# Patient Record
Sex: MALE | Race: OTHER | HISPANIC OR LATINO | URBAN - METROPOLITAN AREA
[De-identification: names, ages, dates, MRNs, and addresses within clinical notes are randomized per-mention and may not be internally consistent; named-entity substitution may affect disease eponyms.]

---

## 2017-01-15 ENCOUNTER — INPATIENT (INPATIENT)
Facility: HOSPITAL | Age: 32
LOS: 2 days | Discharge: HOME | DRG: 390 | End: 2017-01-18
Attending: SURGERY | Admitting: SURGERY
Payer: COMMERCIAL

## 2017-01-15 VITALS
HEART RATE: 86 BPM | TEMPERATURE: 98 F | DIASTOLIC BLOOD PRESSURE: 77 MMHG | HEIGHT: 67 IN | RESPIRATION RATE: 18 BRPM | WEIGHT: 169.98 LBS | SYSTOLIC BLOOD PRESSURE: 140 MMHG | OXYGEN SATURATION: 98 %

## 2017-01-15 DIAGNOSIS — Z98.890 OTHER SPECIFIED POSTPROCEDURAL STATES: Chronic | ICD-10-CM

## 2017-01-15 DIAGNOSIS — Z98.2 PRESENCE OF CEREBROSPINAL FLUID DRAINAGE DEVICE: Chronic | ICD-10-CM

## 2017-01-15 DIAGNOSIS — K56.69 OTHER INTESTINAL OBSTRUCTION: ICD-10-CM

## 2017-01-15 PROCEDURE — 99222 1ST HOSP IP/OBS MODERATE 55: CPT | Mod: AI

## 2017-01-15 PROCEDURE — 74177 CT ABD & PELVIS W/CONTRAST: CPT | Mod: 26

## 2017-01-15 PROCEDURE — 74020: CPT | Mod: 26

## 2017-01-15 PROCEDURE — 99285 EMERGENCY DEPT VISIT HI MDM: CPT | Mod: 25

## 2017-01-15 RX ORDER — ONDANSETRON 8 MG/1
4 TABLET, FILM COATED ORAL ONCE
Qty: 0 | Refills: 0 | Status: COMPLETED | OUTPATIENT
Start: 2017-01-15 | End: 2017-01-15

## 2017-01-15 RX ORDER — SODIUM CHLORIDE 9 MG/ML
1000 INJECTION INTRAMUSCULAR; INTRAVENOUS; SUBCUTANEOUS
Qty: 0 | Refills: 0 | Status: COMPLETED | OUTPATIENT
Start: 2017-01-15 | End: 2017-01-15

## 2017-01-15 RX ORDER — MORPHINE SULFATE 50 MG/1
4 CAPSULE, EXTENDED RELEASE ORAL ONCE
Qty: 0 | Refills: 0 | Status: DISCONTINUED | OUTPATIENT
Start: 2017-01-15 | End: 2017-01-15

## 2017-01-15 RX ORDER — ACETAMINOPHEN 500 MG
650 TABLET ORAL EVERY 6 HOURS
Qty: 0 | Refills: 0 | Status: DISCONTINUED | OUTPATIENT
Start: 2017-01-15 | End: 2017-01-18

## 2017-01-15 RX ORDER — ENOXAPARIN SODIUM 100 MG/ML
30 INJECTION SUBCUTANEOUS EVERY 24 HOURS
Qty: 0 | Refills: 0 | Status: DISCONTINUED | OUTPATIENT
Start: 2017-01-15 | End: 2017-01-18

## 2017-01-15 RX ORDER — SODIUM CHLORIDE 9 MG/ML
1000 INJECTION, SOLUTION INTRAVENOUS
Qty: 0 | Refills: 0 | Status: DISCONTINUED | OUTPATIENT
Start: 2017-01-15 | End: 2017-01-18

## 2017-01-15 RX ORDER — ONDANSETRON 8 MG/1
4 TABLET, FILM COATED ORAL EVERY 6 HOURS
Qty: 0 | Refills: 0 | Status: DISCONTINUED | OUTPATIENT
Start: 2017-01-15 | End: 2017-01-18

## 2017-01-15 RX ORDER — KETOROLAC TROMETHAMINE 30 MG/ML
15 SYRINGE (ML) INJECTION EVERY 6 HOURS
Qty: 0 | Refills: 0 | Status: DISCONTINUED | OUTPATIENT
Start: 2017-01-15 | End: 2017-01-18

## 2017-01-15 RX ORDER — PANTOPRAZOLE SODIUM 20 MG/1
40 TABLET, DELAYED RELEASE ORAL DAILY
Qty: 0 | Refills: 0 | Status: DISCONTINUED | OUTPATIENT
Start: 2017-01-15 | End: 2017-01-18

## 2017-01-15 RX ADMIN — MORPHINE SULFATE 4 MILLIGRAM(S): 50 CAPSULE, EXTENDED RELEASE ORAL at 06:50

## 2017-01-15 RX ADMIN — Medication 15 MILLIGRAM(S): at 12:45

## 2017-01-15 RX ADMIN — MORPHINE SULFATE 4 MILLIGRAM(S): 50 CAPSULE, EXTENDED RELEASE ORAL at 04:35

## 2017-01-15 RX ADMIN — SODIUM CHLORIDE 1000 MILLILITER(S): 9 INJECTION INTRAMUSCULAR; INTRAVENOUS; SUBCUTANEOUS at 04:16

## 2017-01-15 RX ADMIN — ONDANSETRON 4 MILLIGRAM(S): 8 TABLET, FILM COATED ORAL at 03:45

## 2017-01-15 RX ADMIN — SODIUM CHLORIDE 150 MILLILITER(S): 9 INJECTION, SOLUTION INTRAVENOUS at 21:53

## 2017-01-15 RX ADMIN — SODIUM CHLORIDE 150 MILLILITER(S): 9 INJECTION, SOLUTION INTRAVENOUS at 12:45

## 2017-01-15 RX ADMIN — MORPHINE SULFATE 4 MILLIGRAM(S): 50 CAPSULE, EXTENDED RELEASE ORAL at 04:00

## 2017-01-15 RX ADMIN — Medication 15 MILLIGRAM(S): at 14:17

## 2017-01-15 RX ADMIN — SODIUM CHLORIDE 1000 MILLILITER(S): 9 INJECTION INTRAMUSCULAR; INTRAVENOUS; SUBCUTANEOUS at 03:47

## 2017-01-15 RX ADMIN — ONDANSETRON 4 MILLIGRAM(S): 8 TABLET, FILM COATED ORAL at 07:10

## 2017-01-15 RX ADMIN — MORPHINE SULFATE 4 MILLIGRAM(S): 50 CAPSULE, EXTENDED RELEASE ORAL at 08:09

## 2017-01-15 RX ADMIN — ENOXAPARIN SODIUM 30 MILLIGRAM(S): 100 INJECTION SUBCUTANEOUS at 12:46

## 2017-01-15 RX ADMIN — PANTOPRAZOLE SODIUM 40 MILLIGRAM(S): 20 TABLET, DELAYED RELEASE ORAL at 12:46

## 2017-01-15 NOTE — ED PROCEDURE NOTE - CPROC ED POST PROC CARE GUIDE1
Verbal/written post procedure instructions were given to patient/caregiver./Instructed patient/caregiver regarding signs and symptoms of infection./Keep the cast/splint/dressing clean and dry.

## 2017-01-15 NOTE — H&P ADULT. - RADIOLOGY RESULTS AND INTERPRETATION
Moderate  small  bowel  dilatation  to  site  of  obstruction  in  the  right  lower  quadrant  with  collapse  of  the  distal  small  bowel  consistent  with  SBO,  similar  to  2014.  No  pneumatosis  or  pneumoperitoneum.

## 2017-01-15 NOTE — ED PROCEDURE NOTE - CPROC ED GASTRIC INTUB DETAIL1
Gastric tube connected to low continuous suction./Placement was confirmed by aspiration of gastric secretions./The nasogastric tube (see size above) was inserted via the anatomic location./Bowel sounds present to 4 quadrants.

## 2017-01-15 NOTE — ED PROVIDER NOTE - OBJECTIVE STATEMENT
32 y/o M w/ no significant PMHx presents to the ED c/o abd pain onset today. Pt notes vomiting. Pt describes abd pain as "knot in stomach." Pt denies fever, chills, diarrhea, positive sick contact, recent travel or any other complaints. NKDA.

## 2017-01-15 NOTE — ED PROVIDER NOTE - MEDICAL DECISION MAKING DETAILS
30 y/o M w/ nausea & vomiting. Likely gastroenteritis. Will get labs, IVF, antiemetics, & X-Ray abd to r/o SBO.

## 2017-01-15 NOTE — ED ADULT NURSE NOTE - ED STAT RN HANDOFF DETAILS 2
Pt awaiting bed assignment, no NG tube in place at this time.  states he feels better, give pain scale 2/10 at this time. endorsed to nurse Melissa Delgado in B3 monitoring continues.

## 2017-01-15 NOTE — ED ADULT NURSE REASSESSMENT NOTE - NS ED NURSE REASSESS COMMENT FT1
patient states that his nasogastric tube accidentally came out. Paged Surgery team and spoke to Dr Valdez and was told that he will insert another NG tube when the patient reached the 6th floor, and wanted the patient on the 6North unit right then/now; I informed Dr Valdez that there was no bed assigned to the patient on the 6th floor yet. Charge nurse and Nursing supervisor Katharine notified because patient does not have a bed assigned on the 6th floor for me to call for report and send pt up. monitoring continues.

## 2017-01-15 NOTE — H&P ADULT. - HISTORY OF PRESENT ILLNESS
32 y/o man with hx of  shunt placement in infancy, exp lap and removal of shunt 2012 and hx of small bowel obstruction managed conservatively in past, c/o abdominal pain with nausea and vomiting since early this morning. Pain sharp, with knotty feeling in periumbilical and LUQ. Denies fever, chills, hematemesis  CP, SOB. Last BM last night, +flatus now. CT abd/pel c/w high grade Small bowel obstruction with TP in RLQ. NGT placed in ER with initial drainage of 700 cc bilious fluid.

## 2017-01-15 NOTE — ED PROVIDER NOTE - NS ED MD SCRIBE ATTENDING SCRIBE SECTIONS
HIV/PAST MEDICAL/SURGICAL/SOCIAL HISTORY/PHYSICAL EXAM/REVIEW OF SYSTEMS/DISPOSITION/HISTORY OF PRESENT ILLNESS/VITAL SIGNS( Pullset)

## 2017-01-16 LAB
ANION GAP SERPL CALC-SCNC: 7 MMOL/L — SIGNIFICANT CHANGE UP (ref 5–17)
BASOPHILS # BLD AUTO: 0.1 K/UL — SIGNIFICANT CHANGE UP (ref 0–0.2)
BASOPHILS NFR BLD AUTO: 0.9 % — SIGNIFICANT CHANGE UP (ref 0–2)
BUN SERPL-MCNC: 4 MG/DL — LOW (ref 7–18)
CALCIUM SERPL-MCNC: 8.1 MG/DL — LOW (ref 8.4–10.5)
CHLORIDE SERPL-SCNC: 108 MMOL/L — SIGNIFICANT CHANGE UP (ref 96–108)
CO2 SERPL-SCNC: 29 MMOL/L — SIGNIFICANT CHANGE UP (ref 22–31)
CREAT SERPL-MCNC: 1.03 MG/DL — SIGNIFICANT CHANGE UP (ref 0.5–1.3)
EOSINOPHIL # BLD AUTO: 0 K/UL — SIGNIFICANT CHANGE UP (ref 0–0.5)
EOSINOPHIL NFR BLD AUTO: 0.5 % — SIGNIFICANT CHANGE UP (ref 0–6)
GLUCOSE SERPL-MCNC: 105 MG/DL — HIGH (ref 70–99)
HCT VFR BLD CALC: 40.7 % — SIGNIFICANT CHANGE UP (ref 39–50)
HGB BLD-MCNC: 13.4 G/DL — SIGNIFICANT CHANGE UP (ref 13–17)
LACTATE SERPL-SCNC: 0.8 MMOL/L — SIGNIFICANT CHANGE UP (ref 0.7–2)
LYMPHOCYTES # BLD AUTO: 1.7 K/UL — SIGNIFICANT CHANGE UP (ref 1–3.3)
LYMPHOCYTES # BLD AUTO: 26.3 % — SIGNIFICANT CHANGE UP (ref 13–44)
MCHC RBC-ENTMCNC: 29.2 PG — SIGNIFICANT CHANGE UP (ref 27–34)
MCHC RBC-ENTMCNC: 33 GM/DL — SIGNIFICANT CHANGE UP (ref 32–36)
MCV RBC AUTO: 88.5 FL — SIGNIFICANT CHANGE UP (ref 80–100)
MONOCYTES # BLD AUTO: 0.7 K/UL — SIGNIFICANT CHANGE UP (ref 0–0.9)
MONOCYTES NFR BLD AUTO: 11.1 % — SIGNIFICANT CHANGE UP (ref 2–14)
NEUTROPHILS # BLD AUTO: 4 K/UL — SIGNIFICANT CHANGE UP (ref 1.8–7.4)
NEUTROPHILS NFR BLD AUTO: 61.2 % — SIGNIFICANT CHANGE UP (ref 43–77)
PLATELET # BLD AUTO: 263 K/UL — SIGNIFICANT CHANGE UP (ref 150–400)
POTASSIUM SERPL-MCNC: 3.7 MMOL/L — SIGNIFICANT CHANGE UP (ref 3.5–5.3)
POTASSIUM SERPL-SCNC: 3.7 MMOL/L — SIGNIFICANT CHANGE UP (ref 3.5–5.3)
RBC # BLD: 4.6 M/UL — SIGNIFICANT CHANGE UP (ref 4.2–5.8)
RBC # FLD: 11.4 % — SIGNIFICANT CHANGE UP (ref 10.3–14.5)
SODIUM SERPL-SCNC: 144 MMOL/L — SIGNIFICANT CHANGE UP (ref 135–145)
WBC # BLD: 6.5 K/UL — SIGNIFICANT CHANGE UP (ref 3.8–10.5)
WBC # FLD AUTO: 6.5 K/UL — SIGNIFICANT CHANGE UP (ref 3.8–10.5)

## 2017-01-16 PROCEDURE — 74020: CPT | Mod: 26

## 2017-01-16 PROCEDURE — 99231 SBSQ HOSP IP/OBS SF/LOW 25: CPT

## 2017-01-16 RX ADMIN — Medication 15 MILLIGRAM(S): at 14:15

## 2017-01-16 RX ADMIN — PANTOPRAZOLE SODIUM 40 MILLIGRAM(S): 20 TABLET, DELAYED RELEASE ORAL at 12:18

## 2017-01-16 RX ADMIN — Medication 15 MILLIGRAM(S): at 13:57

## 2017-01-16 RX ADMIN — SODIUM CHLORIDE 150 MILLILITER(S): 9 INJECTION, SOLUTION INTRAVENOUS at 12:18

## 2017-01-17 LAB
ANION GAP SERPL CALC-SCNC: 7 MMOL/L — SIGNIFICANT CHANGE UP (ref 5–17)
BUN SERPL-MCNC: 4 MG/DL — LOW (ref 7–18)
CALCIUM SERPL-MCNC: 8.1 MG/DL — LOW (ref 8.4–10.5)
CHLORIDE SERPL-SCNC: 107 MMOL/L — SIGNIFICANT CHANGE UP (ref 96–108)
CO2 SERPL-SCNC: 28 MMOL/L — SIGNIFICANT CHANGE UP (ref 22–31)
CREAT SERPL-MCNC: 0.92 MG/DL — SIGNIFICANT CHANGE UP (ref 0.5–1.3)
GLUCOSE SERPL-MCNC: 92 MG/DL — SIGNIFICANT CHANGE UP (ref 70–99)
POTASSIUM SERPL-MCNC: 3.5 MMOL/L — SIGNIFICANT CHANGE UP (ref 3.5–5.3)
POTASSIUM SERPL-SCNC: 3.5 MMOL/L — SIGNIFICANT CHANGE UP (ref 3.5–5.3)
SODIUM SERPL-SCNC: 142 MMOL/L — SIGNIFICANT CHANGE UP (ref 135–145)

## 2017-01-17 PROCEDURE — 99231 SBSQ HOSP IP/OBS SF/LOW 25: CPT

## 2017-01-17 RX ADMIN — SODIUM CHLORIDE 150 MILLILITER(S): 9 INJECTION, SOLUTION INTRAVENOUS at 13:04

## 2017-01-17 RX ADMIN — PANTOPRAZOLE SODIUM 40 MILLIGRAM(S): 20 TABLET, DELAYED RELEASE ORAL at 13:04

## 2017-01-18 ENCOUNTER — TRANSCRIPTION ENCOUNTER (OUTPATIENT)
Age: 32
End: 2017-01-18

## 2017-01-18 VITALS
HEART RATE: 84 BPM | DIASTOLIC BLOOD PRESSURE: 67 MMHG | OXYGEN SATURATION: 96 % | TEMPERATURE: 99 F | RESPIRATION RATE: 16 BRPM | SYSTOLIC BLOOD PRESSURE: 114 MMHG

## 2017-01-18 PROCEDURE — 80048 BASIC METABOLIC PNL TOTAL CA: CPT

## 2017-01-18 PROCEDURE — 99238 HOSP IP/OBS DSCHRG MGMT 30/<: CPT

## 2017-01-18 PROCEDURE — 90686 IIV4 VACC NO PRSV 0.5 ML IM: CPT

## 2017-01-18 PROCEDURE — 83605 ASSAY OF LACTIC ACID: CPT

## 2017-01-18 PROCEDURE — 74177 CT ABD & PELVIS W/CONTRAST: CPT

## 2017-01-18 PROCEDURE — 96375 TX/PRO/DX INJ NEW DRUG ADDON: CPT

## 2017-01-18 PROCEDURE — 96374 THER/PROPH/DIAG INJ IV PUSH: CPT

## 2017-01-18 PROCEDURE — 74020: CPT

## 2017-01-18 PROCEDURE — 85730 THROMBOPLASTIN TIME PARTIAL: CPT

## 2017-01-18 PROCEDURE — 85027 COMPLETE CBC AUTOMATED: CPT

## 2017-01-18 PROCEDURE — 99285 EMERGENCY DEPT VISIT HI MDM: CPT | Mod: 25

## 2017-01-18 PROCEDURE — 96376 TX/PRO/DX INJ SAME DRUG ADON: CPT

## 2017-01-18 PROCEDURE — 80053 COMPREHEN METABOLIC PANEL: CPT

## 2017-01-18 PROCEDURE — 85610 PROTHROMBIN TIME: CPT

## 2017-01-18 RX ORDER — SODIUM CHLORIDE 9 MG/ML
1000 INJECTION, SOLUTION INTRAVENOUS
Qty: 0 | Refills: 0 | Status: DISCONTINUED | OUTPATIENT
Start: 2017-01-18 | End: 2017-01-18

## 2017-01-18 RX ORDER — INFLUENZA VIRUS VACCINE 15; 15; 15; 15 UG/.5ML; UG/.5ML; UG/.5ML; UG/.5ML
0.5 SUSPENSION INTRAMUSCULAR ONCE
Qty: 0 | Refills: 0 | Status: COMPLETED | OUTPATIENT
Start: 2017-01-18 | End: 2017-01-18

## 2017-01-18 RX ADMIN — INFLUENZA VIRUS VACCINE 0.5 MILLILITER(S): 15; 15; 15; 15 SUSPENSION INTRAMUSCULAR at 16:21

## 2017-01-18 RX ADMIN — ENOXAPARIN SODIUM 30 MILLIGRAM(S): 100 INJECTION SUBCUTANEOUS at 11:53

## 2017-01-18 RX ADMIN — PANTOPRAZOLE SODIUM 40 MILLIGRAM(S): 20 TABLET, DELAYED RELEASE ORAL at 11:53

## 2017-01-18 NOTE — DISCHARGE NOTE ADULT - PROVIDER TOKENS
FREE:[LAST:[Tucker],FIRST:[Tiana],PHONE:[phone]],FAX:[(   [phone]    -],ADDRESS:[[phone] [address]]]

## 2017-01-18 NOTE — DISCHARGE NOTE ADULT - HOSPITAL COURSE
32 y/o man with hx of  shunt placement in infancy, exp lap and removal of shunt 2012 and hx of small bowel obstruction managed conservatively in past, c/o abdominal pain with nausea and vomiting since early this morning. Pain sharp, with knotty feeling in periumbilical and LUQ. Denies fever, chills, hematemesis  CP, SOB. Last BM last night, +flatus now. CT abd/pel c/w high grade Small bowel obstruction with TP in RLQ. NGT placed in ER with initial drainage of 700 cc bilious fluid. 30 y/o man with hx of  shunt placement in infancy, exp lap and removal of shunt 2012 and hx of small bowel obstruction managed conservatively in past, c/o abdominal pain with nausea and vomiting since early this morning. Pain sharp, with knotty feeling in periumbilical and LUQ. Denies fever, chills, hematemesis  CP, SOB. Last BM last night, +flatus now. CT abd/pel c/w high grade Small bowel obstruction with TP in RLQ. NGT placed in ER with initial drainage of 700 cc bilious fluid.    Patient was admitted and medically managed. Patient had return of bowel function. Diet was advanced and tolerated. Patient for discharge home with follow up with Dr. Ceballos. 32 y/o man with hx of  shunt placement in infancy, exp lap and removal of shunt 2012 and hx of small bowel obstruction managed conservatively in past, c/o abdominal pain with nausea and vomiting since early this morning. Pain sharp, with knotty feeling in periumbilical and LUQ. Denies fever, chills, hematemesis  CP, SOB. Last BM last night, +flatus now. CT abd/pel c/w high grade Small bowel obstruction with transition point in RLQ. NGT placed in ER with initial drainage of 700 cc bilious fluid.    Patient was admitted and medically managed. Patient had return of bowel function over several days. Diet was advanced and tolerated. Patient for discharge home with follow up with Dr. Ceballos.

## 2017-01-18 NOTE — DISCHARGE NOTE ADULT - CARE PLAN
Principal Discharge DX:	Small bowel obstruction  Goal:	resolved  Instructions for follow-up, activity and diet:	Diet as tolerated   Please follow up with Dr. Ceballos as needed   Activity as tolerated

## 2017-01-18 NOTE — DISCHARGE NOTE ADULT - MEDICATION SUMMARY - MEDICATIONS TO TAKE
I will START or STAY ON the medications listed below when I get home from the hospital:    Carafate 1 g/10 mL oral suspension  -- 10 milliliter(s) by mouth 4 times a day (before meals and at bedtime)  -- Do not take dairy products, antacids, or iron preparations within one hour of this medication.  It is very important that you take or use this exactly as directed.  Do not skip doses or discontinue unless directed by your doctor.  Shake well before use.  Take medication on an empty stomach 1 hour before or 2 to 3 hours after a meal unless otherwise directed by your doctor.    -- Indication: For anemia

## 2017-01-18 NOTE — DISCHARGE NOTE ADULT - NS MD DC FALL RISK RISK
For information on Fall & Injury Prevention, visit [email or web address]

## 2017-01-18 NOTE — DISCHARGE NOTE ADULT - OTHER SIGNIFICANT FINDINGS
Patient ID: [id number] Patient Name: DESIREE LOPEZ   YOB: 1985 Sex: M      EXAM: CT ABDOMEN AND PELVIS OC IC      PROCEDURE DATE: 01/15/2017      INTERPRETATION: Clinical history: 31-year-old male, abdominal pain.    Multidetector, 3.75 mm axial tomographic images were obtained from the dome  of the diaphragm to the pubic symphysis following the administration of oral  and intravenous contrast, sagittal and coronal reformatted images were  generated. Comparison is made to the CT of 1/4/2013 and concurrent  radiographs.    Findings: Moderate small bowel dilatation to site of obstruction in the  right lower quadrant with collapse of the distal small bowel consistent with  SBO, similar to 2014. No pneumatosis or pneumoperitoneum.    Lung bases: Clear. Cardiac size within normal limits. No pericardial  effusion.    Peritoneum: No pneumoperitoneum or ascites.    Abdominal organs: Liver, spleen, pancreas, pancreatic duct, CBD, gallbladder  and the adrenal glands are unremarkable.    Kidneys/ureters: Normal.    GI tract, normal appendix visualized.    Pelvis: Dilated bladder with no acute findings.    Vasculature: Aorta, celiac trunk, SMA, REINA and branches normally patent.    Lymph nodes: No adenopathy significant by CT criteria.    Osseous structures/body wall: Unremarkable.    Impression    High-grade small bowel obstruction in the right lower quadrant at site of  anastomosis, similar to 2014. Findings conveyed to Dr. Luz of the ER at  0815 on 1/15/2016              YOSHI LOPEZ M.D., ATTENDING RADIOLOGIST  This document has been electronically signed. Mejia 15 2017 8:21AM

## 2017-01-18 NOTE — DISCHARGE NOTE ADULT - PATIENT PORTAL LINK FT
“You can access the [employer] Patient Portal, offered by [facility], by registering with the following website: [email or web address]”

## 2017-01-18 NOTE — DISCHARGE NOTE ADULT - CONDITIONS AT DISCHARGE
Pt received AOx3 breathing unlabored no c/o resp. distress chest pain or SOB. Pt with Dx. of SBO noted to have resolved Pt abdomen soft non tender non distended BS present in all 4 quadrants Pt tolerating diet advancement denies any N/V. Pt OOB ambulating voiding positive Bm and flatus. cap refill less than 3 seconds pulses present in all extremities pt with positive sensation and mobility no neurovascular deficit noted. Skin warm to touch CDI. Pt for DC home today verbalizes understanding and agreement with DC. Vital signs stable no distress noted All needs of pt met thus far.

## 2017-01-23 DIAGNOSIS — K56.60 UNSPECIFIED INTESTINAL OBSTRUCTION: ICD-10-CM

## 2018-09-19 NOTE — ED PROVIDER NOTE - NS ED MD DISPO ISOLATION
----- Message from Khalida Leon MD sent at 9/19/2018  1:12 PM CDT -----  Please contact parent/guardian with results.  Needs to be on a vitamin containing iron (flintstones is fine, but take whole vitamin not half like it says on the bottle)     None

## 2023-02-19 NOTE — H&P ADULT. - NS MD HP IMMUNE HEPATITIS NO YES
: Radha Matamoros    INDICATION: critical illness    PROCEDURE:  [x] LIMITED ECHO  [x] LIMITED CHEST  [ ] LIMITED RETROPERITONEAL  [ ] LIMITED ABDOMINAL  [ ] LIMITED DVT  [ ] NEEDLE GUIDANCE VASCULAR  [ ] NEEDLE GUIDANCE THORACENTESIS  [ ] NEEDLE GUIDANCE PARACENTESIS  [ ] NEEDLE GUIDANCE PERICARDIOCENTESIS  [ ] OTHER    FINDINGS:  A line predominant pattern bilateral anterior lung fields  no pleural effusion  no consolidation  LVSF normal  RV enlarged, RV>>LV with septal bowing   IVC 1.3 cm      INTERPRETATION:  RV enlargement with septal bowing, continue diuresis       Images uploaded on [employer] Path : Radha Matamoros    INDICATION: critical illness    PROCEDURE:  [x] LIMITED ECHO  [x] LIMITED CHEST  [ ] LIMITED RETROPERITONEAL  [ ] LIMITED ABDOMINAL  [ ] LIMITED DVT  [ ] NEEDLE GUIDANCE VASCULAR  [ ] NEEDLE GUIDANCE THORACENTESIS  [ ] NEEDLE GUIDANCE PARACENTESIS  [ ] NEEDLE GUIDANCE PERICARDIOCENTESIS  [ ] OTHER    FINDINGS:  A line predominant pattern bilateral anterior lung fields  no pleural effusion  no consolidation  LVSF normal  RV enlarged, RV>>LV with septal bowing   IVC 1.3 cm      INTERPRETATION:  RV enlargement with septal bowing, continue diuresis     Images uploaded on [employer]      Attending Addendum:     I was present during the entire procedure and agree with the above findings and interpretation. TIme spent on the procedure was separate from the critical care time spent caring for the patient.     Ted King MD unknown/unsure

## 2023-10-17 NOTE — ED PROVIDER NOTE - NEUROLOGICAL, MLM
The patient was offered a chaperone declines.    Accompanied by: unaccompanied    Subjective:  Date of procedure:10/10/2023    Procedure: Excision of subcutaneous mass from the right posterior shoulder    Results/ Data:  Pathology Results:  Pathologic Diagnosis   Soft tissue, right shoulder mass, resection:   -Lipoma (2.7 x 2.5 x 1.1 cm)        Alert and oriented, no focal deficits, no motor or sensory deficits.